# Patient Record
Sex: MALE | Race: BLACK OR AFRICAN AMERICAN | Employment: UNEMPLOYED | URBAN - METROPOLITAN AREA
[De-identification: names, ages, dates, MRNs, and addresses within clinical notes are randomized per-mention and may not be internally consistent; named-entity substitution may affect disease eponyms.]

---

## 2017-06-17 ENCOUNTER — APPOINTMENT (OUTPATIENT)
Dept: GENERAL RADIOLOGY | Facility: CLINIC | Age: 47
End: 2017-06-17
Attending: EMERGENCY MEDICINE

## 2017-06-17 ENCOUNTER — HOSPITAL ENCOUNTER (EMERGENCY)
Facility: CLINIC | Age: 47
Discharge: HOME | End: 2017-06-17
Attending: EMERGENCY MEDICINE | Admitting: EMERGENCY MEDICINE

## 2017-06-17 VITALS
WEIGHT: 200 LBS | SYSTOLIC BLOOD PRESSURE: 119 MMHG | TEMPERATURE: 97.8 F | DIASTOLIC BLOOD PRESSURE: 70 MMHG | BODY MASS INDEX: 26.51 KG/M2 | HEIGHT: 73 IN | RESPIRATION RATE: 20 BRPM | OXYGEN SATURATION: 99 % | HEART RATE: 108 BPM

## 2017-06-17 DIAGNOSIS — F41.9 ANXIETY: ICD-10-CM

## 2017-06-17 DIAGNOSIS — R07.9 CHEST PAIN, UNSPECIFIED TYPE: ICD-10-CM

## 2017-06-17 LAB
ALBUMIN SERPL-MCNC: 4.1 G/DL (ref 3.4–5)
ALP SERPL-CCNC: 73 U/L (ref 40–150)
ALT SERPL W P-5'-P-CCNC: 22 U/L (ref 0–70)
ANION GAP SERPL CALCULATED.3IONS-SCNC: 7 MMOL/L (ref 3–14)
AST SERPL W P-5'-P-CCNC: 11 U/L (ref 0–45)
BASOPHILS # BLD AUTO: 0.1 10E9/L (ref 0–0.2)
BASOPHILS NFR BLD AUTO: 0.8 %
BILIRUB SERPL-MCNC: 0.3 MG/DL (ref 0.2–1.3)
BUN SERPL-MCNC: 12 MG/DL (ref 7–30)
CALCIUM SERPL-MCNC: 8.6 MG/DL (ref 8.5–10.1)
CHLORIDE SERPL-SCNC: 100 MMOL/L (ref 94–109)
CO2 SERPL-SCNC: 27 MMOL/L (ref 20–32)
CREAT SERPL-MCNC: 0.8 MG/DL (ref 0.66–1.25)
D DIMER PPP FEU-MCNC: NORMAL UG/ML FEU (ref 0–0.5)
DIFFERENTIAL METHOD BLD: NORMAL
EOSINOPHIL # BLD AUTO: 0.1 10E9/L (ref 0–0.7)
EOSINOPHIL NFR BLD AUTO: 1.3 %
ERYTHROCYTE [DISTWIDTH] IN BLOOD BY AUTOMATED COUNT: 13.1 % (ref 10–15)
GFR SERPL CREATININE-BSD FRML MDRD: ABNORMAL ML/MIN/1.7M2
GLUCOSE SERPL-MCNC: 144 MG/DL (ref 70–99)
HCT VFR BLD AUTO: 42.5 % (ref 40–53)
HGB BLD-MCNC: 14.4 G/DL (ref 13.3–17.7)
IMM GRANULOCYTES # BLD: 0.1 10E9/L (ref 0–0.4)
IMM GRANULOCYTES NFR BLD: 0.5 %
LIPASE SERPL-CCNC: 128 U/L (ref 73–393)
LYMPHOCYTES # BLD AUTO: 3.2 10E9/L (ref 0.8–5.3)
LYMPHOCYTES NFR BLD AUTO: 29.4 %
MCH RBC QN AUTO: 31.4 PG (ref 26.5–33)
MCHC RBC AUTO-ENTMCNC: 33.9 G/DL (ref 31.5–36.5)
MCV RBC AUTO: 93 FL (ref 78–100)
MONOCYTES # BLD AUTO: 1.2 10E9/L (ref 0–1.3)
MONOCYTES NFR BLD AUTO: 10.9 %
NEUTROPHILS # BLD AUTO: 6.3 10E9/L (ref 1.6–8.3)
NEUTROPHILS NFR BLD AUTO: 57.1 %
NRBC # BLD AUTO: 0 10*3/UL
NRBC BLD AUTO-RTO: 0 /100
PLATELET # BLD AUTO: 347 10E9/L (ref 150–450)
POTASSIUM SERPL-SCNC: 3.5 MMOL/L (ref 3.4–5.3)
PROT SERPL-MCNC: 7.3 G/DL (ref 6.8–8.8)
RBC # BLD AUTO: 4.59 10E12/L (ref 4.4–5.9)
SODIUM SERPL-SCNC: 134 MMOL/L (ref 133–144)
TROPONIN I SERPL-MCNC: NORMAL UG/L (ref 0–0.04)
TROPONIN I SERPL-MCNC: NORMAL UG/L (ref 0–0.04)
WBC # BLD AUTO: 11 10E9/L (ref 4–11)

## 2017-06-17 PROCEDURE — 85379 FIBRIN DEGRADATION QUANT: CPT | Performed by: EMERGENCY MEDICINE

## 2017-06-17 PROCEDURE — 85025 COMPLETE CBC W/AUTO DIFF WBC: CPT | Performed by: EMERGENCY MEDICINE

## 2017-06-17 PROCEDURE — 96374 THER/PROPH/DIAG INJ IV PUSH: CPT

## 2017-06-17 PROCEDURE — 99285 EMERGENCY DEPT VISIT HI MDM: CPT | Mod: 25

## 2017-06-17 PROCEDURE — 84484 ASSAY OF TROPONIN QUANT: CPT | Performed by: EMERGENCY MEDICINE

## 2017-06-17 PROCEDURE — 71020 XR CHEST 2 VW: CPT

## 2017-06-17 PROCEDURE — 25000132 ZZH RX MED GY IP 250 OP 250 PS 637: Performed by: EMERGENCY MEDICINE

## 2017-06-17 PROCEDURE — 25000128 H RX IP 250 OP 636: Performed by: EMERGENCY MEDICINE

## 2017-06-17 PROCEDURE — 36415 COLL VENOUS BLD VENIPUNCTURE: CPT | Performed by: EMERGENCY MEDICINE

## 2017-06-17 PROCEDURE — 80053 COMPREHEN METABOLIC PANEL: CPT | Performed by: EMERGENCY MEDICINE

## 2017-06-17 PROCEDURE — 93005 ELECTROCARDIOGRAM TRACING: CPT

## 2017-06-17 PROCEDURE — 83690 ASSAY OF LIPASE: CPT | Performed by: EMERGENCY MEDICINE

## 2017-06-17 RX ORDER — LORAZEPAM 0.5 MG/1
0.5 TABLET ORAL EVERY 8 HOURS PRN
Qty: 12 TABLET | Refills: 0 | Status: SHIPPED | OUTPATIENT
Start: 2017-06-17 | End: 2020-07-26

## 2017-06-17 RX ORDER — LORAZEPAM 0.5 MG/1
0.5 TABLET ORAL ONCE
Status: COMPLETED | OUTPATIENT
Start: 2017-06-17 | End: 2017-06-17

## 2017-06-17 RX ORDER — LABETALOL HYDROCHLORIDE 5 MG/ML
10 INJECTION, SOLUTION INTRAVENOUS ONCE
Status: COMPLETED | OUTPATIENT
Start: 2017-06-17 | End: 2017-06-17

## 2017-06-17 RX ADMIN — LORAZEPAM 0.5 MG: 0.5 TABLET ORAL at 05:11

## 2017-06-17 RX ADMIN — LABETALOL HYDROCHLORIDE 10 MG: 5 INJECTION, SOLUTION INTRAVENOUS at 06:05

## 2017-06-17 ASSESSMENT — ENCOUNTER SYMPTOMS
NUMBNESS: 0
WEAKNESS: 0
DIARRHEA: 0
SHORTNESS OF BREATH: 0
NAUSEA: 0
VOMITING: 0

## 2017-06-17 NOTE — ED AVS SNAPSHOT
[facility] Emergency Department    [address]    [facility] [address]    Phone:  [phone]    Fax:  [phone]                                       Roxanne Denise   MRN: [id number]    Department:  [facility] Emergency Department   Date of Visit:  6/17/2017           Patient Information     Date Of Birth          1970        Your diagnoses for this visit were:     Chest pain, unspecified type     Anxiety        You were seen by Christina Alegre MD.      Follow-up Information     Follow up with [facility] In 3 days.    Contact information:    [address] [id number] [phone]          Discharge Instructions       Please follow up closely with your regular physician. Please return to the ED if your symptoms worsen or if you develop new or concerning symptoms.     Discharge Instructions  Chest Pain    You have been seen today for chest pain or discomfort.  At this time, your doctor has found no signs that your chest pain is due to a serious or life-threatening condition, (or you have declined more testing and/or admission to the hospital). However, sometimes there is a serious problem that does not show up right away. Your evaluation today may not be complete and you may need further testing and evaluation.     You need to follow-up with your regular doctor within 3 days.    Return to the Emergency Department if:    Your chest pain changes, gets worse, starts to happen more often, or comes with less activity.    You are short of breath.    You get very weak or tired.    You pass out or faint.    You have any new symptoms, like fever, cough, numb legs, or you cough up blood.    You have anything else that worries you.    Until you follow-up with your regular doctor please do the following:    Take one aspirin daily unless you have an allergy or are told not to by your doctor.    If a stress test appointment has been  made, go to the appointment.    If you have questions, contact your regular doctor.    If your doctor today has told you to follow-up with your regular doctor, it is very important that you make an appointment with your clinic and go to the appointment.  If you do not follow-up with your primary doctor, it may result in missing an important development which could result in permanent injury or disability and/or lasting pain.  If there is any problem keeping your appointment, call your doctor or return to the Emergency Department.    If you were given a prescription for medicine here today, be sure to read all of the information (including the package insert) that comes with your prescription.  This will include important information about the medicine, its side effects, and any warnings that you need to know about.  The pharmacist who fills the prescription can provide more information and answer questions you may have about the medicine.  If you have questions or concerns that the pharmacist cannot address, please call or return to the Emergency Department.     Opioid Medication Information    Pain medications are among the most commonly prescribed medicines, so we are including this information for all our patients. If you did not receive pain medication or get a prescription for pain medicine, you can ignore it.     You may have been given a prescription for an opioid (narcotic) pain medicine and/or have received a pain medicine while here in the Emergency Department. These medicines can make you drowsy or impaired. You must not drive, operate dangerous equipment, or engage in any other dangerous activities while taking these medications. If you drive while taking these medications, you could be arrested for DUI, or driving under the influence. Do not drink any alcohol while you are taking these medications.     Opioid pain medications can cause addiction. If you have a history of chemical dependency of any type,  you are at a higher risk of becoming addicted to pain medications.  Only take these prescribed medications to treat your pain when all other options have been tried. Take it for as short a time and as few doses as possible. Store your pain pills in a secure place, as they are frequently stolen and provide a dangerous opportunity for children or visitors in your house to start abusing these powerful medications. We will not replace any lost or stolen medicine.  As soon as your pain is better, you should flush all your remaining medication.     Many prescription pain medications contain Tylenol  (acetaminophen), including Vicodin , Tylenol #3 , Norco , Lortab , and Percocet .  You should not take any extra pills of Tylenol  if you are using these prescription medications or you can get very sick.  Do not ever take more than 3000 mg of acetaminophen in any 24 hour period.    All opioids tend to cause constipation. Drink plenty of water and eat foods that have a lot of fiber, such as fruits, vegetables, prune juice, apple juice and high fiber cereal.  Take a laxative if you don t move your bowels at least every other day. Miralax , Milk of Magnesia, Colace , or Senna  can be used to keep you regular.      Remember that you can always come back to the Emergency Department if you are not able to see your regular doctor in the amount of time listed above, if you get any new symptoms, or if there is anything that worries you.          24 Hour Appointment Hotline       To make an appointment at any [facility], call [phone] ([phone]). If you don't have a family doctor or clinic, we will help you find one. [facility] clinics are conveniently located to serve the needs of you and your family.          ED Discharge Orders     Exercise Stress Echocardiogram                    Review of your medicines      START taking        Dose / Directions Last dose taken    LORazepam 0.5 MG tablet   Commonly known as:  ATIVAN    Dose:  0.5 mg   Quantity:  12 tablet        Take 1 tablet (0.5 mg) by mouth every 8 hours as needed for anxiety   Refills:  0          Our records show that you are taking the medicines listed below. If these are incorrect, please call your family doctor or clinic.        Dose / Directions Last dose taken    * acetaminophen-codeine 300-30 MG per tablet   Commonly known as:  TYLENOL/codeine #3   Dose:  1-2 tablet   Quantity:  20 tablet        Take 1-2 tablets by mouth every 6 hours as needed for pain.   Refills:  0        * acetaminophen-codeine 300-30 MG per tablet   Commonly known as:  TYLENOL/codeine #3   Dose:  1-2 tablet   Quantity:  8 tablet        Take 1-2 tablets by mouth every 6 hours as needed for pain.   Refills:  0        calcium carbonate 500 MG chewable tablet   Commonly known as:  TUMS   Dose:  2 chew tab   Quantity:  90 chew tab        Take 2 tablets by mouth 3 times daily as needed for heartburn.   Refills:  0        NO ACTIVE MEDICATIONS        Refills:  0        zolpidem 5 MG tablet   Commonly known as:  AMBIEN   Dose:  5 mg   Quantity:  5 tablet        Take 1 tablet by mouth nightly as needed for sleep.   Refills:  0        * Notice:  This list has 2 medication(s) that are the same as other medications prescribed for you. Read the directions carefully, and ask your doctor or other care provider to review them with you.            Prescriptions were sent or printed at these locations (1 Prescription)                   Other Prescriptions                Printed at Department/Unit printer (1 of 1)         LORazepam (ATIVAN) 0.5 MG tablet                Procedures and tests performed during your visit     CBC + differential    Chest XR,  PA & LAT    Comprehensive metabolic panel    D dimer quantitative    EKG 12 lead    Lipase    Troponin I    Troponin I (now)      Orders Needing Specimen Collection     None      Pending Results     No orders found from 6/15/2017 to 6/18/2017.            Pending  Culture Results     No orders found from 6/15/2017 to 6/18/2017.            Pending Results Instructions     If you had any lab results that were not finalized at the time of your Discharge, you can call the ED Lab Result RN at [phone]. You will be contacted by this team for any positive Lab results or changes in treatment. The nurses are available 7 days a week from 10A to 6:30P.  You can leave a message 24 hours per day and they will return your call.        Test Results From Your Hospital Stay        6/17/2017  5:12 AM      Component Results     Component Value Ref Range & Units Status    WBC 11.0 4.0 - 11.0 10e9/L Final    RBC Count 4.59 4.4 - 5.9 10e12/L Final    Hemoglobin 14.4 13.3 - 17.7 g/dL Final    Hematocrit 42.5 40.0 - 53.0 % Final    MCV 93 78 - 100 fl Final    MCH 31.4 26.5 - 33.0 pg Final    MCHC 33.9 31.5 - 36.5 g/dL Final    RDW 13.1 10.0 - 15.0 % Final    Platelet Count 347 150 - 450 10e9/L Final    Diff Method Automated Method  Final    % Neutrophils 57.1 % Final    % Lymphocytes 29.4 % Final    % Monocytes 10.9 % Final    % Eosinophils 1.3 % Final    % Basophils 0.8 % Final    % Immature Granulocytes 0.5 % Final    Nucleated RBCs 0 0 /100 Final    Absolute Neutrophil 6.3 1.6 - 8.3 10e9/L Final    Absolute Lymphocytes 3.2 0.8 - 5.3 10e9/L Final    Absolute Monocytes 1.2 0.0 - 1.3 10e9/L Final    Absolute Eosinophils 0.1 0.0 - 0.7 10e9/L Final    Absolute Basophils 0.1 0.0 - 0.2 10e9/L Final    Abs Immature Granulocytes 0.1 0 - 0.4 10e9/L Final    Absolute Nucleated RBC 0.0  Final         6/17/2017  5:30 AM      Component Results     Component Value Ref Range & Units Status    Sodium 134 133 - 144 mmol/L Final    Potassium 3.5 3.4 - 5.3 mmol/L Final    Chloride 100 94 - 109 mmol/L Final    Carbon Dioxide 27 20 - 32 mmol/L Final    Anion Gap 7 3 - 14 mmol/L Final    Glucose 144 (H) 70 - 99 mg/dL Final    Urea Nitrogen 12 7 - 30 mg/dL Final    Creatinine 0.80 0.66 - 1.25 mg/dL Final    GFR  Estimate >90  Non  GFR Calc   >60 mL/min/1.7m2 Final    GFR Estimate If Black >90   GFR Calc   >60 mL/min/1.7m2 Final    Calcium 8.6 8.5 - 10.1 mg/dL Final    Bilirubin Total 0.3 0.2 - 1.3 mg/dL Final    Albumin 4.1 3.4 - 5.0 g/dL Final    Protein Total 7.3 6.8 - 8.8 g/dL Final    Alkaline Phosphatase 73 40 - 150 U/L Final    ALT 22 0 - 70 U/L Final    AST 11 0 - 45 U/L Final         6/17/2017  5:30 AM      Component Results     Component Value Ref Range & Units Status    Lipase 128 73 - 393 U/L Final         6/17/2017  5:23 AM      Component Results     Component Value Ref Range & Units Status    D Dimer  0.0 - 0.50 ug/ml FEU Final    <0.3  This D-dimer assay is intended for use in conjuntion with a clinical pretest   probability assessment model to exclude pulmonary embolism (PE) and as an aid   in the diagnosis of deep venous thrombosis (DVT) in outpatients suspected of PE   or DVT. The cut-off value is 0.5 g/mL FEU.           6/17/2017  5:30 AM      Component Results     Component Value Ref Range & Units Status    Troponin I ES  0.000 - 0.045 ug/L Final    <0.015  The 99th percentile for upper reference range is 0.045 ug/L.  Troponin values in   the range of 0.045 - 0.120 ug/L may be associated with risks of adverse   clinical events.           6/17/2017  5:54 AM      Narrative     XR CHEST 2 VW 6/17/2017 5:48 AM    HISTORY: Chest pain.    COMPARISON: 8/27/2012    FINDINGS: No airspace consolidation, pleural effusion or pneumothorax.  Normal heart size.        Impression     IMPRESSION: No acute cardiopulmonary abnormality.    FRANCIS CAGLE MD         6/17/2017  7:25 AM      Component Results     Component Value Ref Range & Units Status    Troponin I ES  0.000 - 0.045 ug/L Final    <0.015  The 99th percentile for upper reference range is 0.045 ug/L.  Troponin values in   the range of 0.045 - 0.120 ug/L may be associated with risks of adverse   clinical events.                   Clinical Quality Measure: Blood Pressure Screening     Your blood pressure was checked while you were in the emergency department today. The last reading we obtained was  BP: 136/86 . Please read the guidelines below about what these numbers mean and what you should do about them.  If your systolic blood pressure (the top number) is less than 120 and your diastolic blood pressure (the bottom number) is less than 80, then your blood pressure is normal. There is nothing more that you need to do about it.  If your systolic blood pressure (the top number) is 120-139 or your diastolic blood pressure (the bottom number) is 80-89, your blood pressure may be higher than it should be. You should have your blood pressure rechecked within a year by a primary care provider.  If your systolic blood pressure (the top number) is 140 or greater or your diastolic blood pressure (the bottom number) is 90 or greater, you may have high blood pressure. High blood pressure is treatable, but if left untreated over time it can put you at risk for heart attack, stroke, or kidney failure. You should have your blood pressure rechecked by a primary care provider within the next 4 weeks.  If your provider in the emergency department today gave you specific instructions to follow-up with your doctor or provider even sooner than that, you should follow that instruction and not wait for up to 4 weeks for your follow-up visit.        Thank you for choosing [facility]       Thank you for choosing [facility] for your care. Our goal is always to provide you with excellent care. Hearing back from our patients is one way we can continue to improve our services. Please take a few minutes to complete the written survey that you may receive in the mail after you visit with us. Thank you!        [employer] Information     [employer] lets you send messages to your doctor, view your test results, renew your prescriptions, schedule appointments and more. To sign up,  "go to [email or web address] . Click on \"Log in\" on the left side of the screen, which will take you to the Welcome page. Then click on \"Sign up Now\" on the right side of the page.     You will be asked to enter the access code listed below, as well as some personal information. Please follow the directions to create your username and password.     Your access code is: [email or web address]  Expires: 9/15/2017  7:34 AM     Your access code will  in 90 days. If you need help or a new code, please call your [facility] clinic or [phone].        Care EveryWhere ID     This is your Care EveryWhere ID. This could be used by other organizations to access your [facility] medical records  [id number]        After Visit Summary       This is your record. Keep this with you and show to your community pharmacist(s) and doctor(s) at your next visit.                  "

## 2017-06-17 NOTE — ED NOTES
Reviewed discharge paperwork w/ pt and family members. Answered all questions. Pt verbalized understanding.

## 2017-06-17 NOTE — DISCHARGE INSTRUCTIONS
Please follow up closely with your regular physician. Please return to the ED if your symptoms worsen or if you develop new or concerning symptoms.     Discharge Instructions  Chest Pain    You have been seen today for chest pain or discomfort.  At this time, your doctor has found no signs that your chest pain is due to a serious or life-threatening condition, (or you have declined more testing and/or admission to the hospital). However, sometimes there is a serious problem that does not show up right away. Your evaluation today may not be complete and you may need further testing and evaluation.     You need to follow-up with your regular doctor within 3 days.    Return to the Emergency Department if:    Your chest pain changes, gets worse, starts to happen more often, or comes with less activity.    You are short of breath.    You get very weak or tired.    You pass out or faint.    You have any new symptoms, like fever, cough, numb legs, or you cough up blood.    You have anything else that worries you.    Until you follow-up with your regular doctor please do the following:    Take one aspirin daily unless you have an allergy or are told not to by your doctor.    If a stress test appointment has been made, go to the appointment.    If you have questions, contact your regular doctor.    If your doctor today has told you to follow-up with your regular doctor, it is very important that you make an appointment with your clinic and go to the appointment.  If you do not follow-up with your primary doctor, it may result in missing an important development which could result in permanent injury or disability and/or lasting pain.  If there is any problem keeping your appointment, call your doctor or return to the Emergency Department.    If you were given a prescription for medicine here today, be sure to read all of the information (including the package insert) that comes with your prescription.  This will include  important information about the medicine, its side effects, and any warnings that you need to know about.  The pharmacist who fills the prescription can provide more information and answer questions you may have about the medicine.  If you have questions or concerns that the pharmacist cannot address, please call or return to the Emergency Department.     Opioid Medication Information    Pain medications are among the most commonly prescribed medicines, so we are including this information for all our patients. If you did not receive pain medication or get a prescription for pain medicine, you can ignore it.     You may have been given a prescription for an opioid (narcotic) pain medicine and/or have received a pain medicine while here in the Emergency Department. These medicines can make you drowsy or impaired. You must not drive, operate dangerous equipment, or engage in any other dangerous activities while taking these medications. If you drive while taking these medications, you could be arrested for DUI, or driving under the influence. Do not drink any alcohol while you are taking these medications.     Opioid pain medications can cause addiction. If you have a history of chemical dependency of any type, you are at a higher risk of becoming addicted to pain medications.  Only take these prescribed medications to treat your pain when all other options have been tried. Take it for as short a time and as few doses as possible. Store your pain pills in a secure place, as they are frequently stolen and provide a dangerous opportunity for children or visitors in your house to start abusing these powerful medications. We will not replace any lost or stolen medicine.  As soon as your pain is better, you should flush all your remaining medication.     Many prescription pain medications contain Tylenol  (acetaminophen), including Vicodin , Tylenol #3 , Norco , Lortab , and Percocet .  You should not take any extra pills  of Tylenol  if you are using these prescription medications or you can get very sick.  Do not ever take more than 3000 mg of acetaminophen in any 24 hour period.    All opioids tend to cause constipation. Drink plenty of water and eat foods that have a lot of fiber, such as fruits, vegetables, prune juice, apple juice and high fiber cereal.  Take a laxative if you don t move your bowels at least every other day. Miralax , Milk of Magnesia, Colace , or Senna  can be used to keep you regular.      Remember that you can always come back to the Emergency Department if you are not able to see your regular doctor in the amount of time listed above, if you get any new symptoms, or if there is anything that worries you.

## 2017-06-17 NOTE — ED PROVIDER NOTES
"  History     Chief Complaint:  Chest Pain    HPI   Roxanne Denise is a 47 year old male who presents to the emergency department today for evaluation of left sided chest pain that began at approximately 0100. Pain remains in chest and does not radiate into left shoulder. He notes that pain was at one point \"hot.\" Following onset of pain, patient went to measure BP and noted it to be high. Patient also reports feeling nauseated since onset of pain, with sensations of wanting to belch. Patient reports that he has had recent deaths of a family member and friend which has made him worried. Patient had been feeling well otherwise. He denies leg pain or swelling. He also denies shortness of breath, abdominal pain, weakness, numbness, nausea, vomiting, diarrhea, or recent falls and injury. No other concerns were voiced at this time.     Cardiac Risk Factors:  The patient has a history of tobacco use, but denies a history of diabetes, hypertension, hyperlipidemia, obesity, family history of early CAD, sedentary lifestyle, autoimmune disease, known coronary artery disease, age greater than 65.    PE/DVT Risk Factors:  The patient denies a personal or family history of PE, DVT, or other clotting disorders. The patient denies any recent travel, surgery, or other prolonged immobilization. The patient denies hormone use, but reports history of tobacco use.     Allergies:  No Known Drug Allergies    Medications:    The patient is currently on no regular medications.      Past Medical History:    History reviewed. No pertinent past medical history.    Past Surgical History:    History reviewed. No pertinent past surgical history.    Family History:    History reviewed. No pertinent family history.     Social History:  The patient was accompanied to the ED by family.  Smoking Status: Positive  Alcohol Use: Negative  Marital Status:   [2]     Review of Systems   Respiratory: Negative for shortness of breath.  " "  Cardiovascular: Positive for chest pain. Negative for leg swelling.   Gastrointestinal: Negative for diarrhea, nausea and vomiting.   Neurological: Negative for weakness and numbness.   All other systems reviewed and are negative.    Physical Exam   First Vitals:  BP: (!) 192/98  Pulse: 108  Temp: 97.8  F (36.6  C)  Resp: 20  Height: 185.4 cm (6' 1\")  Weight: 90.7 kg (200 lb)  SpO2: 98 %    Physical Exam  Constitutional: The patient is oriented to person, place, and time. Alert and cooperative.  HENT:   Right Ear: External ear normal.   Left Ear: External ear normal.   Nose: Nose normal.   Mouth/Throat: Uvula is midline, oropharynx is clear and moist and mucous membranes are normal. No posterior oropharyngeal edema or erythema.   Eyes: Conjunctivae, EOM and lids are normal. Pupils are equal, round, and reactive to light.   Neck: Trachea normal. Normal range of motion. Neck supple.   Cardiovascular: tachycardia, regular rhythm, normal heart sounds, and intact distal pulses.    Pulmonary/Chest: Effort normal and breath sounds equal bilaterally. No crackles or wheezing.   Abdominal: Soft. No tenderness. No rebound and no guarding.   Musculoskeletal: Normal range of motion.  No extremity tenderness or edema.  Neurological: Alert and Oriented. Strength 5/5 in upper and lower extremities bilaterally. Sensation intact to light touch throughout.  Skin: Skin is dry. No rash noted.          Emergency Department Course     ECG:  ECG taken at 0452, ECG read at 0458  Sinus tachycardia  Incomplete right bundle branch block  Borderline ECG  Rate 107 bpm. AZ interval 148. QRS duration 102. QT/QTc 358/477. P-R-T axes 61 -26 81.    Imaging:  Radiology findings were communicated with the patient who voiced understanding of the findings.    Chest xray PA & LAT:  IMPRESSION: No acute cardiopulmonary abnormality.  Reading per radiology    Laboratory:  Laboratory findings were communicated with the patient who voiced understanding of " the findings.  CBC: AWNL. (WBC 11.0, HGB 14.4, )   CMP: Glucose: 144(H), Creatinine 0.80   Lipase: 128  Troponin: <0.015  D dimer: <0.3    Interventions:  0511 Ativan 0.5 mg Oral     Emergency Department Course:  Nursing notes and vitals reviewed.  I performed an exam of the patient as documented above.   IV was inserted and blood was drawn for laboratory testing, results above.  The patient was sent for a chest xray while in the emergency department, results above.     I discussed lab and imaging results with patient. He notes resolution of symptoms after the ativan. Discussed need for repeat troponin.     He was signed out to Dr. Monique pending repeat troponin.    I personally reviewed the treatment plan with the Patient and answered all related questions prior to transfer of care.   Impression & Plan      Medical Decision Making:  Roxanne Denise is a 47 year old male who presents to the ED for evaluation of chest pain. Upon presentation to the ED, the patient is nontoxic appearing. He is hypertensive and tachycardic, though his vital signs are otherwise within normal limits and stable. On exam, he is well appearing. He is alert, oriented, and neuro exam is nonfocal. Cardiopulmonary exam is unremarkable. Abdomen is soft and nontender throughout. The rest of his exam is as mentioned above. Differential includes, but is not limited to, ACS, PE, pneumonia, pneumothorax, aortic dissection, GERD, esophageal rupture, pericarditis, or MSK pain. EKG was obtained and demonstrates sinus rhythm. There are no concerning acute ischemic changes. Labs were obtained and are as mentioned above. Chest xray was obtained and there is no evidence of an acute cardiopulmonary process. Given the unremarkable chest xray, I have low suspicion for pneumonia or pneumothorax. His history and presentation are not consistent with aortic dissection, esophageal rupture, or pericarditis, and therefore I feel that these diagnoses are less  likely. Given that patient is low risk for PE by Well's criteria and with a negative D dimer, PE is unlikely and further evaluation for this is not indicated at this time. Given the unremarkable EKG and negative initial troponin, this is reassuring. However, given that the patient's symptoms began at 0100, I do feel that repeat troponin is indicated at this time. The patient was then signed out to my colleague, Dr. Monique pending repeat troponin. If this is negative, the patient can likely be discharged to home with close outpatient follow up and outpatient stress test. He was stable/improved at the time of signout.     Diagnosis:    ICD-10-CM    1. Chest pain, unspecified type R07.9 Exercise Stress Echocardiogram   2. Anxiety F41.9        Disposition:   Signed out to Dr. Monique      Scribe Disclosure:  I, Alejandro Martinez, am serving as a scribe at 4:47 AM on 6/17/2017 to document services personally performed by Christina Alegre MD, based on my observations and the provider's statements to me.  [facility] EMERGENCY DEPARTMENT       Christina Alegre MD  06/17/17 0610

## 2017-06-17 NOTE — ED AVS SNAPSHOT
[facility] Emergency Department    [address]    [facility] [address]    Phone:  [phone]    Fax:  [phone]                                       Roxanne Denise   MRN: [id number]    Department:  [facility] Emergency Department   Date of Visit:  6/17/2017           After Visit Summary Signature Page     I have received my discharge instructions, and my questions have been answered. I have discussed any challenges I see with this plan with the nurse or doctor.    ..........................................................................................................................................  Patient/Patient Representative Signature      ..........................................................................................................................................  Patient Representative Print Name and Relationship to Patient    ..................................................               ................................................  Date                                            Time    ..........................................................................................................................................  Reviewed by Signature/Title    ...................................................              ..............................................  Date                                                            Time

## 2017-06-18 LAB — INTERPRETATION ECG - MUSE: NORMAL

## 2019-11-27 ENCOUNTER — HOSPITAL ENCOUNTER (EMERGENCY)
Facility: CLINIC | Age: 49
Discharge: HOME | End: 2019-11-27
Attending: EMERGENCY MEDICINE | Admitting: EMERGENCY MEDICINE
Payer: MEDICAID

## 2019-11-27 VITALS
SYSTOLIC BLOOD PRESSURE: 152 MMHG | TEMPERATURE: 97.7 F | DIASTOLIC BLOOD PRESSURE: 99 MMHG | OXYGEN SATURATION: 96 % | RESPIRATION RATE: 16 BRPM

## 2019-11-27 DIAGNOSIS — K92.1 HEMATOCHEZIA: ICD-10-CM

## 2019-11-27 PROCEDURE — 99283 EMERGENCY DEPT VISIT LOW MDM: CPT

## 2019-11-27 RX ORDER — HYDROCORTISONE ACETATE 25 MG/1
25 SUPPOSITORY RECTAL 2 TIMES DAILY PRN
Qty: 20 SUPPOSITORY | Refills: 0 | Status: SHIPPED | OUTPATIENT
Start: 2019-11-27 | End: 2020-07-26

## 2019-11-27 ASSESSMENT — ENCOUNTER SYMPTOMS
RECTAL PAIN: 0
BLOOD IN STOOL: 1
HEMATURIA: 0
ANAL BLEEDING: 1
CONSTIPATION: 0

## 2019-11-27 NOTE — ED PROVIDER NOTES
History     Chief Complaint:  Rectal bleeding  The history is provided by the patient. A  was used (son translated from Turkish).      Roxanne Denise is a 49 year old male who presents to the emergency department today with rectal bleeding. The patient has been having itching on his buttock with small round sores and he reports blood when he has a bowel movement. He states the blood comes out first, then the stool. He has had 1 other episode of rectal bleeding in the past.  He describes small amounts of blood mixed with stool. He denies blood in his urine, rectal pain, constipation.  He also notes rectal itching.  No nausea, vomiting or abdominal pain.  No fevers.     Allergies:  No Known Drug Allergies     Medications:    Ativan  Ambien    Past Medical History:    The patient denies any relevant past medical history.    Past Surgical History:    History reviewed. No pertinent past surgical history.    Family History:    History reviewed. No pertinent family history.     Social History:  The patient was accompanied to the ED by wife and son.  Smoking Status: Current every day  Alcohol Use: No   Marital Status:       Review of Systems   Gastrointestinal: Positive for anal bleeding and blood in stool. Negative for constipation and rectal pain (itching present).   Genitourinary: Negative for hematuria.   Skin: Positive for rash (sores on buttock).   All other systems reviewed and are negative.        Physical Exam     Patient Vitals for the past 24 hrs:   BP Temp Temp src Heart Rate Resp SpO2   11/27/19 1413 (!) 152/99 97.7  F (36.5  C) Oral 89 16 96 %      Physical Exam    Gen: alert  HEENT: PERRL, oropharynx clear  Neck: normal ROM  CV: RRR, no murmurs  Pulm: breath sounds equal, lungs clear  Abd: Soft, nontender  Back: no evidence of injury, no cva tenderness  MSK: no deformity, moves all extremities  Skin: no rash  Neuro: alert, appropriate conversation and interaction  Rectal:  chaparoned by ODALYS Garcia- anus normal, no fissure, no external hemorroids, no perianal tenderness or redness or lesions, small palpable abnormality on ERICH- suspicious for internal hemorroid, no bleeding on ERICH,  Emergency Department Course   Emergency Department Course:  Nursing notes and vitals reviewed.  1425: I performed an exam of the patient as documented above.   1449: Findings and plan explained to the Patient and spouse. Patient discharged home with instructions regarding supportive care, medications, and reasons to return. The importance of close follow-up was reviewed. The patient was prescribed Anusol and Psyllium.    I personally answered all related questions prior to discharge.      Impression & Plan    Medical Decision Making:  Roxanne Denise is a 49 year old male who presents for rectal discomfort/itching and small volume hematochezia. Based on symptoms, thought this was likely hemorrhoids, though discussed ddx does include malignancy with patient and family.  Discussed need colonoscopy for further eval of rectal bleeding. No external hemorrhoid on exam, however, palpable suspected hemorrhoid on digital rectal exam. Recommended Anusol suppositories for discomfort. Recommended primary care follow up. Outpatient colonoscopy ordered. Absolutely no abdominal tenderness on exam to suggest other intraabdominal pathology at this time. Discharged home.     Diagnosis:    ICD-10-CM    1. Hematochezia K92.1 GASTROENTEROLOGY ADULT REF PROCEDURE ONLY       Disposition:  discharged to home    Discharge Medications:  Discharge Medication List as of 11/27/2019  3:07 PM      START taking these medications    Details   hydrocortisone (ANUSOL-HC) 25 MG suppository Place 1 suppository (25 mg) rectally 2 times daily as needed for hemorrhoids (rectal pain or itching), Disp-20 suppository, R-0, Local Print      psyllium (METAMUCIL/KONSYL) capsule Take 1 capsule by mouth daily, Disp-90 capsule, R-3, Local Print              Scribe Disclosure:  I, Sherice Valdez MD, am serving as a scribe at 3:04 PM on 11/27/2019 to document services personally performed by Lizbet Roa based on my observations and the provider's statements to me.    11/27/2019   [facility] EMERGENCY DEPARTMENT       Lizbet Roa MD  11/27/19 1540

## 2019-11-27 NOTE — ED AVS SNAPSHOT
[facility] Emergency Department  [address]  [facility] [address]  Phone:  [phone]  Fax:  [phone]                                    Roxanne Denise   MRN: [id number]    Department:  [facility] Emergency Department   Date of Visit:  11/27/2019           After Visit Summary Signature Page    I have received my discharge instructions, and my questions have been answered. I have discussed any challenges I see with this plan with the nurse or doctor.    ..........................................................................................................................................  Patient/Patient Representative Signature      ..........................................................................................................................................  Patient Representative Print Name and Relationship to Patient    ..................................................               ................................................  Date                                   Time    ..........................................................................................................................................  Reviewed by Signature/Title    ...................................................              ..............................................  Date                                               Time          22EPIC Rev 08/18

## 2019-11-27 NOTE — ED NOTES
VSS, Pt verbalized understanding of discharge instructions and ambulated to lobby with steady gait.

## 2019-11-27 NOTE — DISCHARGE INSTRUCTIONS
Please make an appointment to follow up with any of the following [facility] [phone], [facility] [phone], [facility] [phone], and [facility] [phone] as soon as possible for follow up of rectal bleeding and colonoscopy.

## 2019-11-27 NOTE — ED TRIAGE NOTES
"PT states, \"This bleeding when I go to the bathroom is bright red and it itches, this started last night and I had this same thing about a month ago.\" VSS and ABC's intact.  "

## 2020-04-22 ENCOUNTER — APPOINTMENT (OUTPATIENT)
Dept: CT IMAGING | Facility: CLINIC | Age: 50
End: 2020-04-22
Attending: PHYSICIAN ASSISTANT
Payer: COMMERCIAL

## 2020-04-22 ENCOUNTER — APPOINTMENT (OUTPATIENT)
Dept: GENERAL RADIOLOGY | Facility: CLINIC | Age: 50
End: 2020-04-22
Attending: PHYSICIAN ASSISTANT
Payer: COMMERCIAL

## 2020-04-22 ENCOUNTER — HOSPITAL ENCOUNTER (EMERGENCY)
Facility: CLINIC | Age: 50
Discharge: HOME | End: 2020-04-22
Attending: PHYSICIAN ASSISTANT | Admitting: PHYSICIAN ASSISTANT
Payer: COMMERCIAL

## 2020-04-22 VITALS
SYSTOLIC BLOOD PRESSURE: 164 MMHG | HEART RATE: 97 BPM | RESPIRATION RATE: 18 BRPM | TEMPERATURE: 97.7 F | OXYGEN SATURATION: 100 % | DIASTOLIC BLOOD PRESSURE: 81 MMHG

## 2020-04-22 DIAGNOSIS — I10 HTN (HYPERTENSION): ICD-10-CM

## 2020-04-22 DIAGNOSIS — R42 LIGHTHEADEDNESS: ICD-10-CM

## 2020-04-22 DIAGNOSIS — E87.6 HYPOKALEMIA: ICD-10-CM

## 2020-04-22 LAB
ANION GAP SERPL CALCULATED.3IONS-SCNC: 3 MMOL/L (ref 3–14)
BASOPHILS # BLD AUTO: 0.1 10E9/L (ref 0–0.2)
BASOPHILS NFR BLD AUTO: 0.9 %
BUN SERPL-MCNC: 11 MG/DL (ref 7–30)
CALCIUM SERPL-MCNC: 9.2 MG/DL (ref 8.5–10.1)
CHLORIDE SERPL-SCNC: 95 MMOL/L (ref 94–109)
CO2 SERPL-SCNC: 35 MMOL/L (ref 20–32)
CREAT SERPL-MCNC: 0.71 MG/DL (ref 0.66–1.25)
DIFFERENTIAL METHOD BLD: NORMAL
EOSINOPHIL # BLD AUTO: 0 10E9/L (ref 0–0.7)
EOSINOPHIL NFR BLD AUTO: 0.5 %
ERYTHROCYTE [DISTWIDTH] IN BLOOD BY AUTOMATED COUNT: 13 % (ref 10–15)
GFR SERPL CREATININE-BSD FRML MDRD: >90 ML/MIN/{1.73_M2}
GLUCOSE SERPL-MCNC: 187 MG/DL (ref 70–99)
HCT VFR BLD AUTO: 50.2 % (ref 40–53)
HGB BLD-MCNC: 17 G/DL (ref 13.3–17.7)
IMM GRANULOCYTES # BLD: 0 10E9/L (ref 0–0.4)
IMM GRANULOCYTES NFR BLD: 0.2 %
INTERPRETATION ECG - MUSE: NORMAL
LYMPHOCYTES # BLD AUTO: 1.7 10E9/L (ref 0.8–5.3)
LYMPHOCYTES NFR BLD AUTO: 21 %
MCH RBC QN AUTO: 31.6 PG (ref 26.5–33)
MCHC RBC AUTO-ENTMCNC: 33.9 G/DL (ref 31.5–36.5)
MCV RBC AUTO: 93 FL (ref 78–100)
MONOCYTES # BLD AUTO: 0.9 10E9/L (ref 0–1.3)
MONOCYTES NFR BLD AUTO: 10.7 %
NEUTROPHILS # BLD AUTO: 5.5 10E9/L (ref 1.6–8.3)
NEUTROPHILS NFR BLD AUTO: 66.7 %
NRBC # BLD AUTO: 0 10*3/UL
NRBC BLD AUTO-RTO: 0 /100
PLATELET # BLD AUTO: 414 10E9/L (ref 150–450)
POTASSIUM SERPL-SCNC: 2.9 MMOL/L (ref 3.4–5.3)
RBC # BLD AUTO: 5.38 10E12/L (ref 4.4–5.9)
SODIUM SERPL-SCNC: 133 MMOL/L (ref 133–144)
TROPONIN I SERPL-MCNC: <0.015 UG/L (ref 0–0.04)
WBC # BLD AUTO: 8.2 10E9/L (ref 4–11)

## 2020-04-22 PROCEDURE — 99285 EMERGENCY DEPT VISIT HI MDM: CPT | Mod: 25

## 2020-04-22 PROCEDURE — 71046 X-RAY EXAM CHEST 2 VIEWS: CPT

## 2020-04-22 PROCEDURE — 70450 CT HEAD/BRAIN W/O DYE: CPT

## 2020-04-22 PROCEDURE — 84484 ASSAY OF TROPONIN QUANT: CPT | Performed by: PHYSICIAN ASSISTANT

## 2020-04-22 PROCEDURE — 96360 HYDRATION IV INFUSION INIT: CPT

## 2020-04-22 PROCEDURE — 85025 COMPLETE CBC W/AUTO DIFF WBC: CPT | Performed by: PHYSICIAN ASSISTANT

## 2020-04-22 PROCEDURE — 80048 BASIC METABOLIC PNL TOTAL CA: CPT | Performed by: PHYSICIAN ASSISTANT

## 2020-04-22 PROCEDURE — 25800030 ZZH RX IP 258 OP 636: Performed by: PHYSICIAN ASSISTANT

## 2020-04-22 PROCEDURE — 93005 ELECTROCARDIOGRAM TRACING: CPT

## 2020-04-22 PROCEDURE — 25000132 ZZH RX MED GY IP 250 OP 250 PS 637: Performed by: PHYSICIAN ASSISTANT

## 2020-04-22 RX ORDER — ACETAMINOPHEN 325 MG/1
975 TABLET ORAL ONCE
Status: COMPLETED | OUTPATIENT
Start: 2020-04-22 | End: 2020-04-22

## 2020-04-22 RX ORDER — POTASSIUM CHLORIDE 1500 MG/1
40 TABLET, EXTENDED RELEASE ORAL ONCE
Status: COMPLETED | OUTPATIENT
Start: 2020-04-22 | End: 2020-04-22

## 2020-04-22 RX ADMIN — SODIUM CHLORIDE 1000 ML: 9 INJECTION, SOLUTION INTRAVENOUS at 11:21

## 2020-04-22 RX ADMIN — POTASSIUM CHLORIDE 40 MEQ: 1500 TABLET, EXTENDED RELEASE ORAL at 12:53

## 2020-04-22 RX ADMIN — ACETAMINOPHEN 975 MG: 325 TABLET, FILM COATED ORAL at 13:16

## 2020-04-22 ASSESSMENT — ENCOUNTER SYMPTOMS
NAUSEA: 0
VOMITING: 0
DIZZINESS: 1
CHILLS: 1
FEVER: 0
DIARRHEA: 0
RHINORRHEA: 1
CONFUSION: 1
SPEECH DIFFICULTY: 1
HEADACHES: 1
WEAKNESS: 0
CHEST TIGHTNESS: 1
NERVOUS/ANXIOUS: 1

## 2020-04-22 NOTE — ED PROVIDER NOTES
History     Chief Complaint:  Dizziness    The history is provided by the patient. The history is limited by a language barrier. A  was used ([employer]  on I[employer]).      Roxanne Denise is a 50 year old male with a history of hypertension who presents with concerns over elevated blood pressure and dizziness. The patient visited an Urgent Care in [place] about 4 days ago for dizziness. At that time he was diagnosed with high blood pressure and was given a prescription of hydrochlorothiazide 25 mg and Meclizine for his dizziness. However, the patient feels that the hydrochlorothiazide exacerbate his dizziness, prompting him to present to the emergency department. The dizziness also seems to be worse when he is tired or when the side of his head hurts. He describes the dizziness as feeling as if the floor is moving. He has not had any recent head trauma. Meclizine does improve his dizziness. He endorses chills, speech difficulty, confusion, congestion, rhinorrhea, and ear plugging in the last 4 days. Occasionally he feels the muscles in his chest are tight. He also notes having some anxiousness with his new diagnosis of elevated blood pressure. He denies walking difficulty, fevers, weakness, nausea, vomiting, and diarrhea.     Allergies:  No Known Drug Allergies     Medications:     Psyllium   Hydrochlorothiazide   Meclizine    Past Medical History:    Hypertension     Past Surgical History:     History reviewed.  No pertinent past surgical history.    Family History:    History reviewed. No pertinent family history.    Social History:  The patient was unaccompanied to the ED.  Smoking Status: current everyday smoker of 0.5 packs per day  Alcohol Use: no  Marital Status:   [2]     Review of Systems   Constitutional: Positive for chills. Negative for fever.   HENT: Positive for congestion and rhinorrhea.    Respiratory: Positive for chest tightness.    Gastrointestinal: Negative for  diarrhea, nausea and vomiting.   Musculoskeletal: Negative for gait problem.   Neurological: Positive for dizziness, speech difficulty and headaches. Negative for weakness.   Psychiatric/Behavioral: Positive for confusion. The patient is nervous/anxious.    All other systems reviewed and are negative.      Physical Exam     Patient Vitals for the past 24 hrs:   BP Temp Temp src Pulse Heart Rate Resp SpO2   04/22/20 1245 (!) 164/81 -- -- -- -- 18 100 %   04/22/20 1130 (!) 170/93 -- -- 97 88 8 100 %   04/22/20 1030 (!) 164/81 -- -- 102 111 17 100 %   04/22/20 1022 (!) 172/91 -- -- -- -- -- 100 %   04/22/20 1013 -- 97.7  F (36.5  C) Oral -- -- 18 100 %       Physical Exam  Constitutional: Pleasant. Cooperative.  Eyes: Pupils equally round and reactive. No nystagmus.  HENT: Head is normal in appearance. Oropharynx is normal with moist mucus membranes. TMs normal.  Cardiovascular: Regular rate and rhythm and without murmurs.  Respiratory: Normal respiratory effort, lungs are clear bilaterally.  GI: Abdomen is soft, non-tender, non-distended. No guarding, rebound, or rigidity.  Skin: Normal, without rash.  Neurologic: Cranial nerves II-XII intact, nl cognition, no focal deficits. Alert and oriented x 3. Normal  strength. Normal leg raise. Sensation to light touch intact throughout all 4 extremities. 5/5 strength with dorsiflexion and plantarflexion bilaterally. No pronator drift. Normal finger nose finger.   Psychiatric: Normal affect.  Nursing notes and vital signs reviewed.    Emergency Department Course     ECG:  Indication: chest pain  ECG taken at 1026, ECG read at 1032 by Dr. Alessandro Martinez MD  Sinus tachycardia   Left axis deviation  Nonspecific ST abnormality  Abnormal ECG  Rate 110 bpm. ID interval 148. QRS duration 106. QT/QTc 376/508. P-R-T axes 62 -38 78.    Imaging:  Radiology findings were communicated with the patient who voiced understanding of the findings.    CT head w/o contrast:   IMPRESSION:  No acute intracranial abnormality.  Report per radiology     XR chest:  IMPRESSION: PA and lateral views of the chest. Lungs are clear. Heart  is normal in size. No effusions are evident. No pneumothorax.  Report per radiology     Laboratory:  Laboratory findings were communicated with the patient who voiced understanding of the findings.    Troponin (Collected 1120): <0.015  BMP: Potassium 2.9 (L), carbon dioxide 35 (H), glucose 187 (H) o/w WNL (Creatinine 0.71)  CBC: AWNL (WBC 8.2, HGB 17.0, )     Interventions:  1121 NS 1L IV Bolus  1253 Potassium chloride ER PO  1316 Tylenol 975 mg PO    Emergency Department Course:  Past medical records, nursing notes, and vitals reviewed.    1044 I performed an exam of the patient as documented above.     EKG obtained in the ED, see results above.     IV was inserted and blood was drawn for laboratory testing, results above.    The patient was sent for a chest XR and head CT w/o contrast while in the emergency department, results above.     1245 I rechecked the patient and discussed the results of his workup thus far.     Findings and plan explained to the Patient. Patient discharged home with instructions regarding supportive care, medications, and reasons to return. The importance of close follow-up was reviewed.     I personally reviewed the laboratory and imaging results with the Patient and answered all related questions prior to discharge.     Impression & Plan     Medical Decision Making:  Roxanne Denise is a 50 year old male who presents to the emergency department for evaluation of lightheadedness.  The patient was recently diagnosed with hypertension and prescribed HCTZ 25 mg.  This was in the setting of a prior evaluation 4 days ago where he was evaluated for dizziness while out of town.  At the time it was thought to be peripheral in etiology and the patient was prescribed meclizine for symptomatic management.  Patient has continued to experience  lightheadedness/dizziness since that time.  See HPI as above for additional details.  Vitals and physical exam as above.  Differential was broad and included BPPV, labyrinthitis, CVA, Ménière's, cardiac etiology such as valvular pathology or arrhythmia, anemia, hypoglycemia, infectious source, orthostatic hypotension, vasovagal episode, among others.  The above work-up was obtained.  Work-up continues to appear to be related to a peripheral source.  Neuro exam is completely normal suggesting against CVA at this time.  Patient has had recent URI symptoms, so symptoms may be secondary labyrinthitis.  No evidence for cardiac etiology, patient's not anemic.  Patient not hypoglycemic.  Chest x-ray is normal suggesting against dissection or other infectious source.  Patient did have evidence for hypokalemia, and this was replenished via p.o. Patient additionally provided information regarding replenishing this via diet.  I suspect there is a component of anxiety to his symptoms at this time, as patient was very forthcoming about this through the duration of his stay.  I advised the patient to continue to use meclizine for symptomatic management.  He should follow-up with his PCP to discuss blood pressure medication and for recheck of his blood pressure on a regular basis.  Patient reported understanding of this. Discussed reasons to return. All questions answered. Patient discharged to home in stable condition.    Diagnosis:    ICD-10-CM    1. Lightheadedness  R42    2. Hypokalemia  E87.6    3. HTN (hypertension)  I10        Disposition:  Discharged to home.    Scribe Disclosure:  IJovita, am serving as a scribe at 10:43 AM on 4/22/2020 to document services personally performed by Fabrice Landon PA-C based on my observations and the provider's statements to me.     4/22/2020   [facility] EMERGENCY DEPARTMENT       Fabrice Landon PA-C  04/22/20 2584

## 2020-04-22 NOTE — ED AVS SNAPSHOT
[facility] Emergency Department  [address]  [facility] [address]  Phone:  [phone]  Fax:  [phone]                                    Roxanne Denise   MRN: [id number]    Department:  [facility] Emergency Department   Date of Visit:  4/22/2020           After Visit Summary Signature Page    I have received my discharge instructions, and my questions have been answered. I have discussed any challenges I see with this plan with the nurse or doctor.    ..........................................................................................................................................  Patient/Patient Representative Signature      ..........................................................................................................................................  Patient Representative Print Name and Relationship to Patient    ..................................................               ................................................  Date                                   Time    ..........................................................................................................................................  Reviewed by Signature/Title    ...................................................              ..............................................  Date                                               Time          22EPIC Rev 08/18

## 2020-07-26 ENCOUNTER — APPOINTMENT (OUTPATIENT)
Dept: GENERAL RADIOLOGY | Facility: CLINIC | Age: 50
End: 2020-07-26
Attending: EMERGENCY MEDICINE
Payer: COMMERCIAL

## 2020-07-26 ENCOUNTER — HOSPITAL ENCOUNTER (EMERGENCY)
Facility: CLINIC | Age: 50
Discharge: HOME | End: 2020-07-26
Attending: EMERGENCY MEDICINE | Admitting: EMERGENCY MEDICINE
Payer: COMMERCIAL

## 2020-07-26 VITALS
DIASTOLIC BLOOD PRESSURE: 84 MMHG | OXYGEN SATURATION: 99 % | SYSTOLIC BLOOD PRESSURE: 136 MMHG | RESPIRATION RATE: 16 BRPM | TEMPERATURE: 98.3 F | HEART RATE: 76 BPM

## 2020-07-26 DIAGNOSIS — R25.1 SHAKINESS: ICD-10-CM

## 2020-07-26 DIAGNOSIS — G47.9 DIFFICULTY SLEEPING: ICD-10-CM

## 2020-07-26 LAB
ALBUMIN SERPL-MCNC: 3.6 G/DL (ref 3.4–5)
ALP SERPL-CCNC: 76 U/L (ref 40–150)
ALT SERPL W P-5'-P-CCNC: 34 U/L (ref 0–70)
ANION GAP SERPL CALCULATED.3IONS-SCNC: 7 MMOL/L (ref 3–14)
AST SERPL W P-5'-P-CCNC: 20 U/L (ref 0–45)
BASOPHILS # BLD AUTO: 0.1 10E9/L (ref 0–0.2)
BASOPHILS NFR BLD AUTO: 0.6 %
BILIRUB SERPL-MCNC: 0.2 MG/DL (ref 0.2–1.3)
BUN SERPL-MCNC: 11 MG/DL (ref 7–30)
CALCIUM SERPL-MCNC: 8.5 MG/DL (ref 8.5–10.1)
CHLORIDE SERPL-SCNC: 102 MMOL/L (ref 94–109)
CO2 SERPL-SCNC: 28 MMOL/L (ref 20–32)
CREAT SERPL-MCNC: 0.68 MG/DL (ref 0.66–1.25)
D DIMER PPP FEU-MCNC: 0.3 UG/ML FEU (ref 0–0.5)
DIFFERENTIAL METHOD BLD: NORMAL
EOSINOPHIL # BLD AUTO: 0.2 10E9/L (ref 0–0.7)
EOSINOPHIL NFR BLD AUTO: 2.1 %
ERYTHROCYTE [DISTWIDTH] IN BLOOD BY AUTOMATED COUNT: 12.7 % (ref 10–15)
GFR SERPL CREATININE-BSD FRML MDRD: >90 ML/MIN/{1.73_M2}
GLUCOSE BLDC GLUCOMTR-MCNC: 142 MG/DL (ref 70–99)
GLUCOSE SERPL-MCNC: 145 MG/DL (ref 70–99)
HCT VFR BLD AUTO: 44.4 % (ref 40–53)
HGB BLD-MCNC: 14.7 G/DL (ref 13.3–17.7)
IMM GRANULOCYTES # BLD: 0 10E9/L (ref 0–0.4)
IMM GRANULOCYTES NFR BLD: 0.4 %
LYMPHOCYTES # BLD AUTO: 3.2 10E9/L (ref 0.8–5.3)
LYMPHOCYTES NFR BLD AUTO: 39.5 %
MCH RBC QN AUTO: 31.1 PG (ref 26.5–33)
MCHC RBC AUTO-ENTMCNC: 33.1 G/DL (ref 31.5–36.5)
MCV RBC AUTO: 94 FL (ref 78–100)
MONOCYTES # BLD AUTO: 0.9 10E9/L (ref 0–1.3)
MONOCYTES NFR BLD AUTO: 11.8 %
NEUTROPHILS # BLD AUTO: 3.6 10E9/L (ref 1.6–8.3)
NEUTROPHILS NFR BLD AUTO: 45.6 %
NRBC # BLD AUTO: 0 10*3/UL
NRBC BLD AUTO-RTO: 0 /100
PLATELET # BLD AUTO: 338 10E9/L (ref 150–450)
POTASSIUM SERPL-SCNC: 3.6 MMOL/L (ref 3.4–5.3)
PROT SERPL-MCNC: 7 G/DL (ref 6.8–8.8)
RBC # BLD AUTO: 4.73 10E12/L (ref 4.4–5.9)
SODIUM SERPL-SCNC: 137 MMOL/L (ref 133–144)
TROPONIN I SERPL-MCNC: <0.015 UG/L (ref 0–0.04)
WBC # BLD AUTO: 8 10E9/L (ref 4–11)

## 2020-07-26 PROCEDURE — 25000128 H RX IP 250 OP 636: Performed by: EMERGENCY MEDICINE

## 2020-07-26 PROCEDURE — 84484 ASSAY OF TROPONIN QUANT: CPT | Performed by: EMERGENCY MEDICINE

## 2020-07-26 PROCEDURE — 99285 EMERGENCY DEPT VISIT HI MDM: CPT | Mod: 25

## 2020-07-26 PROCEDURE — 80053 COMPREHEN METABOLIC PANEL: CPT | Performed by: EMERGENCY MEDICINE

## 2020-07-26 PROCEDURE — 00000146 ZZHCL STATISTIC GLUCOSE BY METER IP

## 2020-07-26 PROCEDURE — 71046 X-RAY EXAM CHEST 2 VIEWS: CPT

## 2020-07-26 PROCEDURE — 96374 THER/PROPH/DIAG INJ IV PUSH: CPT

## 2020-07-26 PROCEDURE — 93005 ELECTROCARDIOGRAM TRACING: CPT

## 2020-07-26 PROCEDURE — 85379 FIBRIN DEGRADATION QUANT: CPT | Performed by: EMERGENCY MEDICINE

## 2020-07-26 PROCEDURE — 85025 COMPLETE CBC W/AUTO DIFF WBC: CPT | Performed by: EMERGENCY MEDICINE

## 2020-07-26 RX ORDER — LORAZEPAM 2 MG/ML
1 INJECTION INTRAMUSCULAR ONCE
Status: COMPLETED | OUTPATIENT
Start: 2020-07-26 | End: 2020-07-26

## 2020-07-26 RX ORDER — HYDROXYZINE PAMOATE 50 MG/1
50 CAPSULE ORAL 3 TIMES DAILY PRN
Qty: 20 CAPSULE | Refills: 0 | Status: SHIPPED | OUTPATIENT
Start: 2020-07-26 | End: 2020-08-20

## 2020-07-26 RX ADMIN — LORAZEPAM 1 MG: 2 INJECTION INTRAMUSCULAR; INTRAVENOUS at 19:52

## 2020-07-26 ASSESSMENT — ENCOUNTER SYMPTOMS
FEVER: 0
VOMITING: 0
SORE THROAT: 0
COUGH: 0
RHINORRHEA: 0
CHILLS: 0
NAUSEA: 0

## 2020-07-26 NOTE — ED AVS SNAPSHOT
[facility] Emergency Department  [address]  [facility] [address]  Phone:  [phone]  Fax:  [phone]                                    Roxanne Denise   MRN: [id number]    Department:  [facility] Emergency Department   Date of Visit:  7/26/2020           After Visit Summary Signature Page    I have received my discharge instructions, and my questions have been answered. I have discussed any challenges I see with this plan with the nurse or doctor.    ..........................................................................................................................................  Patient/Patient Representative Signature      ..........................................................................................................................................  Patient Representative Print Name and Relationship to Patient    ..................................................               ................................................  Date                                   Time    ..........................................................................................................................................  Reviewed by Signature/Title    ...................................................              ..............................................  Date                                               Time          22EPIC Rev 08/18

## 2020-07-27 LAB — INTERPRETATION ECG - MUSE: NORMAL

## 2020-07-27 NOTE — ED TRIAGE NOTES
Patient complaining of shakiness since yesterday.  States he thinks his blood sugar is low.      No history of diabetes.  No glucometer at home.  Drinking a jug of orange juice on arrival.

## 2020-07-27 NOTE — DISCHARGE INSTRUCTIONS
Discharge Instructions  Chest Pain    You have been seen today for chest pain or discomfort.  At this time, your provider has found no signs that your chest pain is due to a serious or life-threatening condition, (or you have declined more testing and/or admission to the hospital). However, sometimes there is a serious problem that does not show up right away. Your evaluation today may not be complete and you may need further testing and evaluation.     Generally, every Emergency Department visit should have a follow-up clinic visit with either a primary or a specialty clinic/provider. Please follow-up as instructed by your emergency provider today.  Return to the Emergency Department if:  Your chest pain changes, gets worse, starts to happen more often, or comes with less activity.  You are newly short of breath.  You get very weak or tired.  You pass out or faint.  You have any new symptoms, like fever, cough, numb legs, or you cough up blood.  You have anything else that worries you.    Until you follow-up with your regular provider, please do the following:  Take one aspirin daily unless you have an allergy or are told not to by your provider.  If a stress test appointment has been made, go to the appointment.  If you have questions, contact your regular provider.  Follow-up with your regular provider/clinic as directed; this is very important.    If you were given a prescription for medicine here today, be sure to read all of the information (including the package insert) that comes with your prescription.  This will include important information about the medicine, its side effects, and any warnings that you need to know about.  The pharmacist who fills the prescription can provide more information and answer questions you may have about the medicine.  If you have questions or concerns that the pharmacist cannot address, please call or return to the Emergency Department.       Remember that you can always  come back to the Emergency Department if you are not able to see your regular provider in the amount of time listed above, if you get any new symptoms, or if there is anything that worries you.

## 2020-07-27 NOTE — ED PROVIDER NOTES
History     Chief Complaint:  Shakiness     used: Son interpreted, declined formal .      Roxanne Denise is a 50 year old male who presents with shakiness. The patient reports that his blood sugar dropped earlier and he had chest pain with associated shakiness. He states he does not have diabetes but has a friend check his blood sugar. Here in the ED, states he is currently not experiencing symptoms. He denies fever, cough, cold symptoms, rhinorrhea, nausea, and vomiting. He states he has not taken ativan today.    His son reports separately that he believes his father's symptoms are psychological and he has had episodes similar to this before where he thinks he is dying.     Allergies:  No known drug allergies    Medications:    Desyrel  Norvasc  Ambien  Ativan  Lipitor  Losartan  Trazodone  Lotrel    Past Medical History:    Hypertension  Insomnia  Hyperlipidemia     Past Surgical History:    History reviewed. No pertinent surgical history.    Family History:    History reviewed. No pertinent family history.     Social History:  Smoking status: Former, quit 4/9/20  Alcohol use: No  PCP: [facility]  Marital Status:   [2]    Review of Systems   Constitutional: Negative for chills and fever.   HENT: Negative for rhinorrhea and sore throat.    Respiratory: Negative for cough.    Cardiovascular: Positive for chest pain.   Gastrointestinal: Negative for nausea and vomiting.   Neurological:        Shakiness   All other systems reviewed and are negative.        Physical Exam     Patient Vitals for the past 24 hrs:   BP Temp Temp src Pulse Heart Rate Resp SpO2   07/26/20 2145 127/78 -- -- 69 -- -- 96 %   07/26/20 2130 (!) 143/89 -- -- 78 -- -- 98 %   07/26/20 2115 (!) 148/86 -- -- 69 -- -- 98 %   07/26/20 2101 138/88 -- -- 77 -- -- 98 %   07/26/20 2041 138/89 -- -- 81 -- -- 96 %   07/26/20 2030 (!) 145/86 -- -- 83 -- -- 100 %   07/26/20 2015 (!) 149/80 -- --  84 -- -- --   07/26/20 2012 -- -- -- -- -- -- 100 %   07/26/20 2000 (!) 152/88 -- -- 90 -- -- 99 %   07/26/20 1958 -- -- -- -- -- -- 99 %   07/26/20 1926 (!) 169/82 -- -- -- -- -- --   07/26/20 1925 -- 98.3  F (36.8  C) Oral -- 103 20 100 %     Physical Exam  Nursing note and vitals reviewed.  Constitutional: Cooperative.   HENT:   Mouth/Throat: Moist mucous membranes.   Eyes: EOMI, nonicteric sclera  Cardiovascular: Normal rate, regular rhythm, no murmurs, rubs, or gallops  Pulmonary/Chest: Effort normal and breath sounds normal. No respiratory distress. No wheezes. No rales.   Abdominal: Soft. Nontender, nondistended, no guarding or rigidity.   Musculoskeletal: Normal range of motion.   Neurological: Alert. Moves all extremities spontaneously.   Skin: Skin is warm and dry. No rash noted.   Psychiatric: anxious mood and affect.       Emergency Department Course   ECG (19:31:01):  Rate 99 bpm. TX interval 132. QRS duration 102. QT/QTc 341/436. P-R-T axes 50 -30 81. Normal sinus rhythm. Left axis deviation. Abnormal ECG. No significant change compared to EKG dated 4/22/20. Interpreted at 1939 by Amando Cisneros MD.     Imaging:  Radiographic findings were communicated with the patient who voiced understanding of the findings.    Chest XR, PA & LAT  There are no acute infiltrates. The cardiac silhouette is   not enlarged. Pulmonary vasculature is unremarkable.   As read by Radiology.  Imaging independently reviewed and agree with radiologist interpretation.      Laboratory:  CBC: WNL (WBC 8.0, HGB 14.7, )  Glucose by meter ([id number]): 142 (H)  CMP (Collected 1958): Glucose 145 (H) o/w WNL (Creatinine 0.68)  Troponin I (Collected 1958): <0.015  D dimer: 0.3    Interventions:  1952: Ativan 1 mg IV    Emergency Department Course:  Past medical records, nursing notes, and vitals reviewed.  1941: I performed an exam of the patient and obtained history, as documented above.  EKG performed, results  above.  The patient was sent for a chest XR, PA & LAT while in the emergency department, findings above.  IV inserted and blood drawn.    2220: I rechecked the patient.  Findings and plan explained to the Patient. Patient discharged home with instructions regarding supportive care, medications, and reasons to return. The importance of close follow-up was reviewed.     Impression & Plan      Medical Decision Making:  Patient presents with multiple complaints.  His vital signs are normal.  On exam, he appears quite anxious and is hyperventilating.  This improved after dose of Ativan.  Regardless, I did evaluate him for more emergent pathology and fortunately his EKG is nonischemic, his troponin is negative, his d-dimer is negative, and the remainder of his labs are unremarkable.  Chest x-ray is also unremarkable.  Patient acknowledges feeling quite anxious and reports that he has been having difficulty sleeping.  He recently saw his doctor for this.  I recommended that he further discuss with his doctor and follow-up with sleep specialist as is already planned.  We will plan on starting patient on hydroxyzine for some help with some of his anxiety.  I see that he was briefly on Ativan in the past, but given high addictive potential, as well as patient already being on Ambien, I would like to avoid benzodiazepines at this time.  No emergent etiology is found to explain patient's symptoms tonight.  I believe likelihood of undiagnosed emergent etiology is low and patient is safe/stable for discharge home.  He and his son are in agreement with this plan.  He is discharged in stable condition.    Diagnosis:    ICD-10-CM    1. Shakiness  R25.1    2. Difficulty sleeping  G47.9      Disposition:  discharged to home    Discharge Medications:  New Prescriptions    HYDROXYZINE (VISTARIL) 50 MG CAPSULE    Take 1 capsule (50 mg) by mouth 3 times daily as needed for anxiety       Jose A Ellis  7/26/2020   [facility]  EMERGENCY DEPARTMENT  I, Jose A Rogersilpete, am serving as a scribe at 7:41 PM on 7/26/2020 to document services personally performed by Amando Cisneros MD based on my observations and the provider's statements to me.        Amando Cisneros MD  07/27/20 0639

## 2020-08-03 ENCOUNTER — HOSPITAL ENCOUNTER (EMERGENCY)
Facility: CLINIC | Age: 50
Discharge: HOME | End: 2020-08-03
Attending: PHYSICIAN ASSISTANT | Admitting: PHYSICIAN ASSISTANT
Payer: COMMERCIAL

## 2020-08-03 VITALS
SYSTOLIC BLOOD PRESSURE: 148 MMHG | RESPIRATION RATE: 16 BRPM | DIASTOLIC BLOOD PRESSURE: 91 MMHG | OXYGEN SATURATION: 100 % | TEMPERATURE: 97.8 F | HEART RATE: 76 BPM

## 2020-08-03 DIAGNOSIS — R03.0 ELEVATED BLOOD PRESSURE READING: ICD-10-CM

## 2020-08-03 DIAGNOSIS — R42 SPELL OF DIZZINESS: ICD-10-CM

## 2020-08-03 DIAGNOSIS — R74.8 ELEVATED LIVER ENZYMES: ICD-10-CM

## 2020-08-03 LAB
ALBUMIN SERPL-MCNC: 3.6 G/DL (ref 3.4–5)
ALP SERPL-CCNC: 106 U/L (ref 40–150)
ALT SERPL W P-5'-P-CCNC: 181 U/L (ref 0–70)
ANION GAP SERPL CALCULATED.3IONS-SCNC: 4 MMOL/L (ref 3–14)
AST SERPL W P-5'-P-CCNC: 64 U/L (ref 0–45)
BASOPHILS # BLD AUTO: 0.1 10E9/L (ref 0–0.2)
BASOPHILS NFR BLD AUTO: 0.6 %
BILIRUB SERPL-MCNC: 0.3 MG/DL (ref 0.2–1.3)
BUN SERPL-MCNC: 12 MG/DL (ref 7–30)
CALCIUM SERPL-MCNC: 8.6 MG/DL (ref 8.5–10.1)
CHLORIDE SERPL-SCNC: 105 MMOL/L (ref 94–109)
CO2 SERPL-SCNC: 29 MMOL/L (ref 20–32)
CREAT SERPL-MCNC: 0.67 MG/DL (ref 0.66–1.25)
DIFFERENTIAL METHOD BLD: NORMAL
EOSINOPHIL # BLD AUTO: 0.1 10E9/L (ref 0–0.7)
EOSINOPHIL NFR BLD AUTO: 1.1 %
ERYTHROCYTE [DISTWIDTH] IN BLOOD BY AUTOMATED COUNT: 13 % (ref 10–15)
GFR SERPL CREATININE-BSD FRML MDRD: >90 ML/MIN/{1.73_M2}
GLUCOSE BLDC GLUCOMTR-MCNC: 116 MG/DL (ref 70–99)
GLUCOSE SERPL-MCNC: 91 MG/DL (ref 70–99)
HCT VFR BLD AUTO: 43.3 % (ref 40–53)
HGB BLD-MCNC: 14.1 G/DL (ref 13.3–17.7)
IMM GRANULOCYTES # BLD: 0 10E9/L (ref 0–0.4)
IMM GRANULOCYTES NFR BLD: 0.5 %
INTERPRETATION ECG - MUSE: NORMAL
LIPASE SERPL-CCNC: 105 U/L (ref 73–393)
LYMPHOCYTES # BLD AUTO: 2.3 10E9/L (ref 0.8–5.3)
LYMPHOCYTES NFR BLD AUTO: 25.6 %
MCH RBC QN AUTO: 30.9 PG (ref 26.5–33)
MCHC RBC AUTO-ENTMCNC: 32.6 G/DL (ref 31.5–36.5)
MCV RBC AUTO: 95 FL (ref 78–100)
MONOCYTES # BLD AUTO: 1.1 10E9/L (ref 0–1.3)
MONOCYTES NFR BLD AUTO: 12.6 %
NEUTROPHILS # BLD AUTO: 5.3 10E9/L (ref 1.6–8.3)
NEUTROPHILS NFR BLD AUTO: 59.6 %
NRBC # BLD AUTO: 0 10*3/UL
NRBC BLD AUTO-RTO: 0 /100
PLATELET # BLD AUTO: 420 10E9/L (ref 150–450)
POTASSIUM SERPL-SCNC: 4.2 MMOL/L (ref 3.4–5.3)
PROT SERPL-MCNC: 7.2 G/DL (ref 6.8–8.8)
RBC # BLD AUTO: 4.56 10E12/L (ref 4.4–5.9)
SODIUM SERPL-SCNC: 138 MMOL/L (ref 133–144)
TROPONIN I SERPL-MCNC: <0.015 UG/L (ref 0–0.04)
WBC # BLD AUTO: 8.9 10E9/L (ref 4–11)

## 2020-08-03 PROCEDURE — 25800030 ZZH RX IP 258 OP 636: Performed by: PHYSICIAN ASSISTANT

## 2020-08-03 PROCEDURE — 85025 COMPLETE CBC W/AUTO DIFF WBC: CPT | Performed by: PHYSICIAN ASSISTANT

## 2020-08-03 PROCEDURE — 83690 ASSAY OF LIPASE: CPT | Performed by: PHYSICIAN ASSISTANT

## 2020-08-03 PROCEDURE — 93005 ELECTROCARDIOGRAM TRACING: CPT

## 2020-08-03 PROCEDURE — 96360 HYDRATION IV INFUSION INIT: CPT

## 2020-08-03 PROCEDURE — 00000146 ZZHCL STATISTIC GLUCOSE BY METER IP

## 2020-08-03 PROCEDURE — 80053 COMPREHEN METABOLIC PANEL: CPT | Performed by: PHYSICIAN ASSISTANT

## 2020-08-03 PROCEDURE — 99284 EMERGENCY DEPT VISIT MOD MDM: CPT | Mod: 25

## 2020-08-03 PROCEDURE — 84484 ASSAY OF TROPONIN QUANT: CPT | Performed by: PHYSICIAN ASSISTANT

## 2020-08-03 RX ADMIN — SODIUM CHLORIDE 1000 ML: 9 INJECTION, SOLUTION INTRAVENOUS at 13:07

## 2020-08-03 ASSESSMENT — ENCOUNTER SYMPTOMS
DIZZINESS: 1
CHILLS: 0
ABDOMINAL PAIN: 1
NAUSEA: 0
FEVER: 0
SHORTNESS OF BREATH: 0
VOMITING: 0

## 2020-08-03 NOTE — ED PROVIDER NOTES
History     Chief Complaint:  Dizziness    HPI   Roxanne Denise is a 50 year old male with a history of hypertension and H pylori who presents with dizziness and abdominal pain. The patient reports that when he gets angry his blood pressure increases. Today he got upset at a restaurant and his blood pressure increased and then felt dizzy.  He reports similar episodes in the past. He stopped taking his blood pressure medications, but last night he felt anxious, so he took 5 mg of his medication. He reports that he currently is not dizzy. Also denies chest pain, shortness of breath, or any other concerning symptoms.. The patient also has concerns for H pylori, as he has similar symptoms from before with LUQ abdominal pain.    Allergies:  No Known Drug Allergies     Medications:    Amlodipine benzoate   Lipitor   Vistaril   Losartan potassium   Trazodone     Past Medical History:    Hyperlipidemia  Hypertension   H pylori     Past Surgical History:    Surgical history reviewed. No pertinent surgical history.    Family History:    Family history reviewed. No pertinent family history.    Social History:  The patient was accompanied to the ED by relative.  Smoking Status: Never Smoker  Smokeless Tobacco: Never Used  Alcohol Use: negative  Drug Use: Negative  PCP: [facility]   Marital Status:          Review of Systems   Constitutional: Negative for chills and fever.   Respiratory: Negative for shortness of breath.    Cardiovascular: Negative for chest pain.   Gastrointestinal: Positive for abdominal pain. Negative for nausea and vomiting.   Neurological: Positive for dizziness.   All other systems reviewed and are negative.      Physical Exam     Patient Vitals for the past 24 hrs:   BP Temp Temp src Pulse Heart Rate Resp SpO2   08/03/20 1400 (!) 148/91 -- -- 76 -- -- 100 %   08/03/20 1340 (!) 157/98 -- -- 79 -- -- 100 %   08/03/20 1335 -- -- -- -- -- -- 100 %   08/03/20 1330 (!) 155/89  -- -- 86 -- -- 100 %   08/03/20 1325 -- -- -- -- -- -- 100 %   08/03/20 1320 -- -- -- -- -- -- 100 %   08/03/20 1315 (!) 145/86 -- -- 72 -- -- 100 %   08/03/20 1310 (!) 139/93 -- -- 79 -- -- --   08/03/20 1250 -- -- -- 87 -- -- --   08/03/20 1245 (!) 154/87 -- -- -- -- -- --   08/03/20 1215 -- -- -- -- -- -- 99 %   08/03/20 1210 -- -- -- -- -- -- 100 %   08/03/20 1205 (!) 144/80 -- -- 85 -- -- 100 %   08/03/20 1114 (!) 151/92 97.8  F (36.6  C) Oral 102 102 16 97 %       Physical Exam  General: Alert, interactive.   Head:  Scalp is atraumatic.  Eyes:  EOM intact. The pupils are equal, round, and reactive to light. No scleral icterus.   ENT:                                      Ears:  The external ears are normal.  Nose:  The external nose is normal.  Throat:  The oropharynx is normal. Mucus membranes are moist.                 Neck:  Normal range of motion. There is no rigidity.   CV:  Regular rate and rhythm. No murmur. 2+ radial pulses  Resp:  Breath sounds are clear bilaterally. Non-labored, no retractions or accessory muscle use.  GI:  Abdomen is soft, no distension, no tenderness. Negative rodriguez sign. No HSM.   MS:  Normal range of motion.   Skin:  Warm and dry.   Neuro:  Strength and sensation grossly intact.   Psych:  Awake. Alert.  Appropriate interactions.     Emergency Department Course     ECG:  ECG taken at 1116  Sinus tachycardia   Left axis deviation   Abnormal ECG  When compared to EKG dated 07/26/2020 no significant change was found  Rate 102 bpm. ME interval 132 ms. QRS duration 98 ms. QT/QTc 358/466 ms. P-R-T axes 70 -32 81.    Laboratory:  Laboratory findings were communicated with the patient who voiced understanding of the findings.    CBC: WBC 8.9, HGB 14.1,   CMP:  (H), Ast 64 (H) o/w WNL (Creatinine 0.67)  Glucose by meter: 116 (H)  Lipase: 105  Troponin (Collected 1212): 0.015    Interventions:  1307 0.9% NS 1000 mL IV     Emergency Department Course:  1116 EKG obtained as  noted above.    1129 Nursing notes and vitals reviewed. I performed an exam of the patient as documented above.     1212 IV was inserted and blood was drawn for laboratory testing, results above.    1240 Glucose by meter obtained as noted above.     Prior to discharge, I personally reviewed the results with the patient and all related questions were answered. The patient verbalized understanding and is amenable to plan.     Findings and plan explained to the Patient. Patient discharged home with instructions regarding supportive care, medications, and reasons to return. The importance of close follow-up was reviewed.       Impression & Plan      Medical Decision Making:  Roxanne Denise is a 50 year old male who presents to the emergency department today for multiple concerns.  He reports intermittent episodes of dizziness that he attributes to his blood pressure rising.  He also is concerned of low blood sugar.  No evidence of hypoglycemia today.  Blood work including CBC, CMP, and lipase overall unremarkable besides slightly elevated liver enzymes.  He denies alcohol use.  Denies excessive Tylenol use.  Unclear source at this time, though patient has no right upper quadrant tenderness and no signs of obstructive process.  Plan to follow-up closely with primary care provider for repeat LFTs.  Screening EKG and troponin negative.  He has no chest pain or shortness of breath to suggest acute coronary syndrome.  Patient's blood pressure was slightly elevated, he recently discontinued his blood pressure medication.  I recommend following up with his primary care provider to discuss blood pressure management.  There is no evidence of endorgan damage.  No evidence of hypertensive urgency or emergency.  Ultimately, I believe the patient is safe for discharge home with close follow-up with primary care provider.  Patient and daughter agree with this plan all questions and concerns addressed prior to discharge  home.      Diagnosis:    ICD-10-CM    1. Spell of dizziness  R42    2. Elevated blood pressure reading  R03.0    3. Elevated liver enzymes  R74.8      Disposition:   The patient is discharged to home.    Discharge Medications:  New Prescriptions    No medications on file     Scribe Disclosure:  SANAZ, Marge Coles, am serving as a scribe at 11:29 AM on 8/3/2020 to document services personally performed by Aniyah Mendenhall PA-C based on my observations and the provider's statements to me.         Aniyah Mendenhall PA-C  08/03/20 1442

## 2020-08-03 NOTE — ED TRIAGE NOTES
Pt with dizziness since last night, denies headache or blurred vision. ABC's intact, alert and oriented X3.

## 2020-08-03 NOTE — DISCHARGE INSTRUCTIONS
Follow up closely with primary care provider.   Return to the ED for any new/concerning symptoms.     Discharge Instructions  Hypertension - High Blood Pressure    During you visit to the Emergency Department, your blood pressure was higher than the recommended blood pressure.  This may be related to stress, pain, medication or other temporary conditions. In these cases, your blood pressure may return to normal on its own. If you have a history of high blood pressure, you may need to have your provider adjust your medications. Sometimes, your high measurement here may indicate that you have developed high blood pressure that will stay high unless it is treated. As a general rule, high blood pressure causes problems over years rather than days, weeks, or months. So, while it is important to treat blood pressure, it is rarely important to treat blood pressure immediately. Occasionally we will begin a medication in the Emergency Department; more often we will recommend close follow-up for medications with a primary doctor/clinic.    Generally, every Emergency Department visit should have a follow-up clinic visit with either a primary or a specialty clinic/provider. Please follow-up as instructed by your emergency provider today.    Return to the Emergency Department if you start to have:  A severe headache.  Chest pain.  Shortness of breath.  Weakness or numbness that affects one part of the body.  Confusion.  Vision changes.  Significant swelling of legs and/or eyes.  A reaction to any medication started in the Emergency Department.    What can I do to help myself?  Avoid alcohol.  Take any blood pressure medicine that you are prescribed.  Get a good night s sleep.  Lower your salt intake.  Exercise.  Lose weight.  Manage stress.  See your doctor regularly    If blood pressure medication was started in the Emergency Department:  The medicine may not have an immediate effect. The body and brain determine what blood  pressure you have. The medicine s job is to retrain the body s  thermostat  to a lower blood pressure.  You will need to follow up with your provider to see how this medicine is working for you.  If you were given a prescription for medicine here today, be sure to read all of the information (including the package insert) that comes with your prescription.  This will include important information about the medicine, its side effects, and any warnings that you need to know about.  The pharmacist who fills the prescription can provide more information and answer questions you may have about the medicine.  If you have questions or concerns that the pharmacist cannot address, please call or return to the Emergency Department.   Remember that you can always come back to the Emergency Department if you are not able to see your regular provider in the amount of time listed above, if you get any new symptoms, or if there is anything that worries you.

## 2020-08-03 NOTE — ED AVS SNAPSHOT
[facility] Emergency Department  [address]  [facility] [address]  Phone:  [phone]  Fax:  [phone]                                    Roxanne Denise   MRN: [id number]    Department:  [facility] Emergency Department   Date of Visit:  8/3/2020           After Visit Summary Signature Page    I have received my discharge instructions, and my questions have been answered. I have discussed any challenges I see with this plan with the nurse or doctor.    ..........................................................................................................................................  Patient/Patient Representative Signature      ..........................................................................................................................................  Patient Representative Print Name and Relationship to Patient    ..................................................               ................................................  Date                                   Time    ..........................................................................................................................................  Reviewed by Signature/Title    ...................................................              ..............................................  Date                                               Time          22EPIC Rev 08/18

## 2020-08-11 ENCOUNTER — HOSPITAL ENCOUNTER (EMERGENCY)
Facility: CLINIC | Age: 50
Discharge: HOME | End: 2020-08-11
Attending: EMERGENCY MEDICINE | Admitting: EMERGENCY MEDICINE
Payer: COMMERCIAL

## 2020-08-11 VITALS
SYSTOLIC BLOOD PRESSURE: 158 MMHG | TEMPERATURE: 97.4 F | WEIGHT: 178.57 LBS | DIASTOLIC BLOOD PRESSURE: 105 MMHG | BODY MASS INDEX: 23.56 KG/M2 | OXYGEN SATURATION: 100 % | RESPIRATION RATE: 18 BRPM

## 2020-08-11 DIAGNOSIS — R07.9 CHEST PAIN, UNSPECIFIED TYPE: ICD-10-CM

## 2020-08-11 DIAGNOSIS — G47.00 INSOMNIA, UNSPECIFIED TYPE: ICD-10-CM

## 2020-08-11 LAB
GLUCOSE BLDC GLUCOMTR-MCNC: 117 MG/DL (ref 70–99)
INTERPRETATION ECG - MUSE: NORMAL

## 2020-08-11 PROCEDURE — 93005 ELECTROCARDIOGRAM TRACING: CPT

## 2020-08-11 PROCEDURE — 00000146 ZZHCL STATISTIC GLUCOSE BY METER IP

## 2020-08-11 PROCEDURE — 99285 EMERGENCY DEPT VISIT HI MDM: CPT | Mod: 25

## 2020-08-11 RX ORDER — TRAZODONE HYDROCHLORIDE 50 MG/1
50 TABLET, FILM COATED ORAL AT BEDTIME
Qty: 7 TABLET | Refills: 0 | Status: SHIPPED | OUTPATIENT
Start: 2020-08-11

## 2020-08-11 ASSESSMENT — ENCOUNTER SYMPTOMS
FEVER: 0
VOMITING: 0
SHORTNESS OF BREATH: 1

## 2020-08-11 NOTE — ED AVS SNAPSHOT
[facility] Emergency Department  [address]  [facility] [address]  Phone:  [phone]  Fax:  [phone]                                    Roxanne Denise   MRN: [id number]    Department:  [facility] Emergency Department   Date of Visit:  8/11/2020           After Visit Summary Signature Page    I have received my discharge instructions, and my questions have been answered. I have discussed any challenges I see with this plan with the nurse or doctor.    ..........................................................................................................................................  Patient/Patient Representative Signature      ..........................................................................................................................................  Patient Representative Print Name and Relationship to Patient    ..................................................               ................................................  Date                                   Time    ..........................................................................................................................................  Reviewed by Signature/Title    ...................................................              ..............................................  Date                                               Time          22EPIC Rev 08/18

## 2020-08-11 NOTE — ED PROVIDER NOTES
"  History     Chief Complaint:  Elevated Blood Sugar and Chest Pain    HPI   Roxanne Denise is a 50 year old male with a history of hypertension who presents to the emergency department for evaluation of chest pain and elevated blood sugar.  Patient reports that he is primarily here because he is worried about his blood sugar.  He states he is often hungry in the morning and is worried that his blood sugar may be abnormal prior to 8 AM.  He cannot get into his primary care doctor until after 8 AM to have his blood sugar checked thus he is presenting to the ED to have his blood sugar checked prior to 8 AM.  He has no history of diabetes.  He is also requesting a refill of his Ambien.  He received a prescription for Ambien on July 26 for 30 tablets but reports that some the medication was lost and insurance will not pay for a new prescription until August 26.    He then later reported to triage nurse that he had chest pain but did not initially divulge this to me.  He reports that this morning around 6 AM, he had a \"gas bubble\" to the left side of the chest that was brief and now resolved.  He had no other symptoms including shortness of breath, nausea or vomiting.  He reports that he has had similar pain in the past.  He denies any history of DVT, PE, unilateral leg swelling, recent immobilization, hemoptysis or malignancy.  He tells me that he is not worried about this pain today.    Allergies:  No Known Drug Allergies    Medications:    Amlodipine Benzoate  Lipitor  Vistaril  Losartan  Trazodone  Inderal    Past Medical History:    Hyperlipidemia  Hypertension  Insomnia     Past Surgical History:    The patient does not have any pertinent past surgical history.    Family History:    No past pertinent family history.    Social History:  Smoking Status: Never  Smokeless Tobacco: Never   Alcohol Use: No  Drug Use: Never    Marital Status:        Review of Systems   Constitutional: Negative for fever. "   Respiratory: Positive for shortness of breath.    Cardiovascular: Positive for chest pain.   Gastrointestinal: Negative for vomiting.   All other systems reviewed and are negative.      Physical Exam   Vitals:  Patient Vitals for the past 24 hrs:   BP Temp Temp src Heart Rate Resp SpO2 Weight   08/11/20 0649 (!) 158/105 97.4  F (36.3  C) Temporal 71 18 100 % 81 kg (178 lb 9.2 oz)       Physical Exam    General:   Pleasant, age appropriate.  Eyes:    Conjunctiva normal  Neck:    Supple, no meningismus.     CV:     Regular rate and rhythm.      No murmurs, rubs or gallops.       No unilateral leg swelling.       2+ radial pulses bilateral.       No lower extremity edema.  PULM:    Clear to auscultation bilateral.       No respiratory distress.      Good air exchange.     No rales or wheezing.     No stridor.  ABD:    Soft, non-tender, non-distended.       No pulsatile masses.       No rebound, guarding or rigidity.  MSK:     No gross deformity to all four extremities.   LYMPH:   No cervical lymphadenopathy.  NEURO:   Alert. Good muscle tone, no atrophy.  Skin:    Warm, dry and intact.    Psych:    Mood is good and affect is appropriate.        Emergency Department Course   ECG:  Indication: Chest pain  Completed at 0759.  Read at 0803.   Rate 70 bpm. TX interval 160. QRS duration 96. QT/QTc 384/414. P-R-T axes -34.  No significant change when compared to 8/3/2020    Laboratory:  Glucose by Meter (Collected 0733): 117 (H)     Emergency Department Course:  Past medical records, nursing notes, and vitals reviewed.    0857 I performed an exam of the patient as documented above.     EKG obtained in the ED, see results above.   IV was inserted and blood was drawn for laboratory testing, results above.  The patient was sent for a chest XR while in the emergency department, results above.     0808 I rechecked the patient and discussed the results of his workup thus far.     Findings and plan explained to the Patient.  Patient discharged home with instructions regarding supportive care, medications, and reasons to return. The importance of close follow-up was reviewed. The patient was prescribed Trazodone.    I personally reviewed the laboratory results with the Patient and answered all related questions prior to discharge.      Impression & Plan      Medical Decision Makin-year-old male presented to the ED with primary request of refill on his Ambien which he read reportedly lost and is not due for a refill until .  I explained to him that this medication needs to be filled through his primary care physician and we do not replace lost medications.  I did provide him with a 7-day supply of trazodone to assist with insomnia until he can have further discussion with his primary care physician.  He was also concerned about possibility of elevated blood sugar but blood sugar reassuring today.    He referenced an episode of chest pain this morning but was not concerning to him.  EKG reveals no dysrhythmia or ischemic changes.  He declined chest x-ray as well as blood work.  He has no risk factors or clinical features concerning for pulmonary embolus, aortic dissection or aortic aneurysm.  I had a prolonged discussion with the patient that I cannot rule out ACS or MI without further investigation.  He has the capacity to make his own medical decisions.  He understands the risk including failure to recognize MI resulting in cardiac dysrhythmia, heart failure, cardiac arrest, permanent neurologic disability and even death.  He would like to be discharged home without any further investigation.  Patient to urgently follow-up with primary care physician.    Diagnosis:    ICD-10-CM    1. Insomnia, unspecified type  G47.00 Glucose by meter     Glucose by meter   2. Chest pain, unspecified type  R07.9        Disposition:  discharged to home    Discharge Medications:  New Prescriptions    TRAZODONE (DESYREL) 50 MG TABLET     Take 1 tablet (50 mg) by mouth At Bedtime     I, Hi Greenberg, am serving as a scribe on 8/11/2020 at 6:57 AM to personally document services performed by Demetrius Wakefield MD based on my observations and the provider's statements to me.   Hi Greenberg  8/11/2020   [facility] EMERGENCY DEPARTMENT       Demetrius Wakefield MD  08/11/20 0957

## 2020-08-11 NOTE — ED TRIAGE NOTES
Pt presented to triage area requesting his blood sugar be checked.  He later stated that he has chest pain since 0600 this AM.

## 2020-08-11 NOTE — ED NOTES
"Patient anxious in room. States he would like to change rooms due to staff wearing PPE. Patient states \"I've been coming here for 25 years and I've never been in this room.\" Discussed infection prevention precautions and that rooms are cleaned between patients. Patient reports he is willing to stay in the ER room at this time. Declined chest xray and continuous telemetry monitoring. Blood sugar and EKG completed. Alert and oriented in room. Provided much emotional support.  "

## 2020-08-11 NOTE — ED NOTES
Patient alert and oriented. Denies chest pain at this time. Reports he would like to get his ambien prescription filled. Per MD, patient to go to clinic to talk to his PCP about getting that medication filled. Provided much emotional support. MD in to see patient and discuss diagnosis, test results, and discharge plan. Patient meets discharge criteria. Discussed AVS with patient. Reinforced the need to call his provider today to schedule an appointment for as soon as possible. Showed patient the number to his PCP's clinic. Questions answered. Patient verbalized understanding. Patient reports being ready to go home. Patient discharged home by car with family with all necessary information.

## 2020-08-20 ENCOUNTER — HOSPITAL ENCOUNTER (EMERGENCY)
Facility: CLINIC | Age: 50
Discharge: HOME | End: 2020-08-20
Attending: EMERGENCY MEDICINE | Admitting: EMERGENCY MEDICINE
Payer: COMMERCIAL

## 2020-08-20 VITALS
WEIGHT: 188.05 LBS | RESPIRATION RATE: 18 BRPM | BODY MASS INDEX: 24.81 KG/M2 | TEMPERATURE: 97.3 F | SYSTOLIC BLOOD PRESSURE: 155 MMHG | OXYGEN SATURATION: 100 % | DIASTOLIC BLOOD PRESSURE: 94 MMHG | HEART RATE: 82 BPM

## 2020-08-20 DIAGNOSIS — G47.00 INSOMNIA, UNSPECIFIED TYPE: ICD-10-CM

## 2020-08-20 PROCEDURE — 25000132 ZZH RX MED GY IP 250 OP 250 PS 637: Performed by: EMERGENCY MEDICINE

## 2020-08-20 PROCEDURE — 99283 EMERGENCY DEPT VISIT LOW MDM: CPT

## 2020-08-20 RX ORDER — HYDROXYZINE HYDROCHLORIDE 25 MG/1
50 TABLET, FILM COATED ORAL ONCE
Status: COMPLETED | OUTPATIENT
Start: 2020-08-20 | End: 2020-08-20

## 2020-08-20 RX ORDER — HYDROXYZINE PAMOATE 25 MG/1
50 CAPSULE ORAL
Qty: 16 CAPSULE | Refills: 0 | Status: SHIPPED | OUTPATIENT
Start: 2020-08-20

## 2020-08-20 RX ADMIN — HYDROXYZINE HYDROCHLORIDE 50 MG: 25 TABLET ORAL at 05:20

## 2020-08-20 ASSESSMENT — ENCOUNTER SYMPTOMS
FEVER: 0
SLEEP DISTURBANCE: 1
HEADACHES: 0
ABDOMINAL PAIN: 0

## 2020-08-20 NOTE — ED PROVIDER NOTES
.  History     Chief Complaint:  Difficulty sleeping    HPI   Roxanne Denise is a 50 year old male who presents with difficulty sleeping for 3 days.  He notes this is not new for him but he has not slept any time at all in 3 days.  Denies any physical concerns aside from gassiness and reflux.  He denies chest pain, abdominal pain, fever.  He denies headache.  He lives with a son who is here with him today and his son notes he has been acting normally.  He notes when he cannot sleep he gets anxious and this makes it worse.  He notes he was seen here for similar symptoms and sent home with prescription for trazodone but cannot fill it because it was not paid for by his insurance.  He has not followed up with a primary care physician yet.    Allergies:  The patient has no known drug allergies.    Medications:    Amlodipine   Lipitor   Hydroxyzine   Losartan   Trazodone      Past Medical History:    hyperlipidemia   hypertension    Social History:  Tobacco use: Never  Alcohol use: No  Drug use: No  PCP: [facility]  Marital Status:   [2]     Review of Systems   Constitutional: Negative for fever.   Cardiovascular: Negative for chest pain.   Gastrointestinal: Negative for abdominal pain.   Neurological: Negative for headaches.   Psychiatric/Behavioral: Positive for sleep disturbance.   All other systems reviewed and are negative.      Physical Exam     Patient Vitals for the past 24 hrs:   BP Temp Temp src Pulse Resp SpO2 Weight   08/20/20 0443 (!) 155/94 97.3  F (36.3  C) Temporal 82 18 100 % 85.3 kg (188 lb 0.8 oz)       Physical Exam  General: Adult male sitting upright  Eyes: PERRL, Conjunctive within normal limits  ENT: Moist mucous membranes, oropharynx clear.   CV:  Regular rate and rhythm  Resp: Normal respiratory effort.  GI: Abdomen is soft, nontender and nondistended. No palpable masses. No rebound or guarding.  MSK: Ambualtory  Skin: Warm and dry.   Neuro: Alert and oriented.  Responds appropriately to all questions and commands. No focal findings appreciated.   Psych: Normal mood and affect. Pleasant.    Emergency Department Course   Interventions:  Atarax 50 mg PO     Emergency Department Course:  Nursing notes and vitals reviewed. (0446) I performed an exam of the patient as documented above.     Medicine administered as documented above.    Findings and plan explained to the Patient. Patient discharged home with instructions regarding supportive care, medications, and reasons to return. The importance of close follow-up was reviewed. The patient was prescribed hydroxyzine and melatonin.     I personally answered all related questions prior to discharge.     Impression & Plan      Medical Decision Making:  Roxanne Denise is a 49 y/o male with a history of insomnia who used to be on Ambien regularly but has discontinued its use due to concerns for developing dependence who presents emergency department for the second time in 10 days with concerns for insomnia.  This episode is been 3 days without sleeping per his report.  Despite that he looks remarkably well.  He is denying any physical concerns aside from gassiness.  Anxiety may be a component of his insomnia based on his history.  He is encouraged to see his primary care physician and seek outpatient sleep studies.  He is encouraged to have a wind down bedtime routine and avoid eating or drinking 2 hours before bedtime.  He is encouraged to use melatonin if this helps with his sleep regulation.  He can also use Vistaril as needed.  He should return to the emergency department for develop physical symptoms, hallucinations, suicidal ideation, etc.  All question answered prior to discharge.    Diagnosis:    ICD-10-CM    1. Insomnia, unspecified type  G47.00        Disposition:  discharged to home with Son.     Discharge Medications:  New Prescriptions    HYDROXYZINE (VISTARIL) 25 MG CAPSULE    Take 2 capsules (50 mg) by mouth nightly as  needed for anxiety (insomnia)    MELATONIN 1 MG CAPS    Take 0.5 mg by mouth At Bedtime     Scribe Disclosure:  I, Juliet Tobin, am serving as a scribe on 8/20/2020 at 4:49 AM to personally document services performed by Ramila Monique MD based on my observations and the provider's statements to me.     Juliet Tobin  8/20/2020   [facility] EMERGENCY DEPARTMENT       Ramila Monique MD  08/20/20 0667

## 2020-08-20 NOTE — ED NOTES
"Pt assessment: with assistance of son as , pt reports previously using Ambien for sleep but stopping 10 days ago due to \"concern for addiction\". Pt reports being unable to sleep for the last 4 days because of this. Pt wants to be able to sleep without the use of a habit forming medication. MD discussed potential use of Benadryl/Melatonin and plan for follow-up with primary care provider. Pt and pt family both verbalized understanding.   "

## 2020-08-20 NOTE — ED AVS SNAPSHOT
[facility] Emergency Department  [address]  [facility] [address]  Phone:  [phone]  Fax:  [phone]                                    Roxanne Denise   MRN: [id number]    Department:  [facility] Emergency Department   Date of Visit:  8/20/2020           After Visit Summary Signature Page    I have received my discharge instructions, and my questions have been answered. I have discussed any challenges I see with this plan with the nurse or doctor.    ..........................................................................................................................................  Patient/Patient Representative Signature      ..........................................................................................................................................  Patient Representative Print Name and Relationship to Patient    ..................................................               ................................................  Date                                   Time    ..........................................................................................................................................  Reviewed by Signature/Title    ...................................................              ..............................................  Date                                               Time          22EPIC Rev 08/18